# Patient Record
(demographics unavailable — no encounter records)

---

## 2025-06-02 NOTE — PHYSICAL EXAM
[Well Developed] : well developed [Well Nourished] : well nourished [No Acute Distress] : no acute distress [Normal Conjunctiva] : normal conjunctiva [Normal Venous Pressure] : normal venous pressure [No Carotid Bruit] : no carotid bruit [Normal S1, S2] : normal S1, S2 [No Rub] : no rub [5th Left ICS - MCL] : palpated at the 5th LICS in the midclavicular line [Normal] : normal [No Precordial Heave] : no precordial heave was noted [Normal Rate] : normal [Rhythm Regular] : regular [Normal S1] : normal S1 [Normal S2] : normal S2 [No Gallop] : no gallop heard [II] : a grade 2 [___ +] : bilateral [unfilled]U+ pretibial pitting edema [2+] : left 2+ [No Abnormalities] : the abdominal aorta was not enlarged and no bruit was heard [Clear Lung Fields] : clear lung fields [Good Air Entry] : good air entry [No Respiratory Distress] : no respiratory distress  [Soft] : abdomen soft [Non Tender] : non-tender [No Masses/organomegaly] : no masses/organomegaly [Normal Bowel Sounds] : normal bowel sounds [Normal Gait] : normal gait [No Edema] : no edema [No Cyanosis] : no cyanosis [No Clubbing] : no clubbing [No Varicosities] : no varicosities [No Rash] : no rash [No Skin Lesions] : no skin lesions [Moves all extremities] : moves all extremities [No Focal Deficits] : no focal deficits [Normal Speech] : normal speech [Alert and Oriented] : alert and oriented [Normal memory] : normal memory [Apical Thrill] : no thrill palpable at the apex [S3] : no S3 [S4] : no S4 [Click] : no click [Pericardial Rub] : no pericardial rub [Right Carotid Bruit] : no bruit heard over the right carotid [Right Femoral Bruit] : no bruit heard over the right femoral artery [Left Carotid Bruit] : no bruit heard over the left carotid [Left Femoral Bruit] : no bruit heard over the left femoral artery [de-identified] : Bilateral venous stasis changes with edema

## 2025-06-02 NOTE — ASSESSMENT
[FreeTextEntry1] : This is a 60-year-old male with past medical history significant for hypertension, non-insulin-dependent diabetes mellitus, hyperlipidemia, reactive airway disease/asthma, GERD, sleep apnea, obesity, who comes in for cardiac follow-up evaluation.  He was born in the [facility] and has no history of rheumatic fever.  He does not drink excessive caffeine or alcohol.  Cardiac risk factors include hypertension, non-insulin-dependent diabetes mellitus, and hyperlipidemia.  HPI: He is feeling generally well today and denies chest pain, dizziness, heart palpitations, recent episodes of syncope or falls, SOB, or dyspnea at this time.   Current Medications: Losartan 50 mg daily and Ozempic 2 mg injection weekly.   Has been tolerating Ozempic well and has lost ~20 lbs.   *Previously on Simvastatin but states he was told to discontinue this at his last appointment along with his Actos medication  PMH: The patient has bilateral lower extremity edema extending from his ankles to his pretibial area 2+/2+.  This has been present for the last few years (4 to 5 years). He does have venous stasis changes, and reports that he was evaluated by a vascular surgeon with no evidence for peripheral arterial disease. The patient was taking Januvia previously but had to stop because his insurance stopped covering it. He is currently taking Actos for diabetes, which he has taken for several years.  The initiation of Actos therapy correlates with the presence of his peripheral edema.   BLOOD PRESSURE: Controlled.    BLOOD WORK:  *LDL target goal < 70* -New blood work was done by his PCP April 2025 and will be faxed over for our records.  -Lipid panel done April 2024 demonstrated triglycerides 46, cholesterol 143, HDL 82, LDL 50, Non-HDL 60, LDL direct 55.    TESTING/REPORTS: -EKG done 06/02/2025 demonstrated regular sinus rhythm rate 89 bpm otherwise unremarkable.   -The patient had a normal exercise stress test September 9, 2024.  -Echocardiogram done May 2024 demonstrated normal left ventricular systolic function EF 63%, trace tricuspid regurgitation, trace pulmonic regurgitation.  -Electrocardiogram done April 18, 2024 demonstrated normal sinus rhythm rate 77 bpm and is otherwise unremarkable.   PLAN: -He will continue his current medications and contact the office if problems arise before next follow-up appointment. -He will follow-up with his primary care doctor who also manages his diabetes in regard to his diabetes medications.    I have discussed the plan of care with MARY JAY and he will follow up in 4-6 months. They are compliant with all of their medications.     The patient understands that aerobic exercises must be increased to 40 minutes 4 times/week and a detailed discussion of lifestyle modification was done today.   The patient has a good understanding of the diagnosis, treatment plan and lifestyle modification.   They will contact me at the office for any questions with their care or any changes in their health status.   The patient was discussed with supervision physician Dr. Boris Olivier at the time of the visit and the plan of care will be carried out as noted above.     BIANCA De Leon NP

## 2025-06-02 NOTE — DISCUSSION/SUMMARY
[FreeTextEntry1] : Dr. Oliveir-(PRIOR VISIT and PMH WITH Dr. Olivier): This is a 59-year-old male with past medical history significant for hypertension, non-insulin-dependent diabetes mellitus, hyperlipidemia, reactive airway disease/asthma, GERD, sleep apnea, obesity, who comes in for cardiac follow-up evaluation. He denies chest pain, shortness of breath, dizziness or syncope.  He was born in the [facility] and has no history of rheumatic fever.  He does not drink excessive caffeine or alcohol. Cardiac risk factors include hypertension, non-insulin-dependent diabetes mellitus, and hyperlipidemia. The patient is lost over 22 pounds on Ozempic therapy.  He is trying to maintain a prudent diet and regular aerobic activity. The patient had a normal exercise stress test September 9, 2024. He will continue on his current diet and exercise program.  He will go for fasting blood work in the next month.  Blood work done April 18, 2024 demonstrated hemoglobin A1c of 6.2, cholesterol 143, HDL 82, triglycerides 46, LDL calculated 50, non-HDL cholesterol 60, LDL direct 55 mg/dL.  Lipoprotein B50 4 mg/dL Electrocardiogram done April 18, 2024 demonstrated normal sinus rhythm rate 77 bpm and is otherwise unremarkable. The patient has bilateral lower extremity edema extending from his ankles to his pretibial area 2+/2+.  This has been present for for the last few years (4 to 5 years). He does have venous stasis changes, and reports that he was evaluated by a vascular surgeon with no evidence for peripheral arterial disease. The patient was taking Januvia previously but had to stop because his insurance stopped covering it. He is currently taking Actos for diabetes, which he has taken for several years.  The initiation of Actos therapy correlates with the presence of his peripheral edema. A known side effect of Actos is edema, so it is likely either causing or contributing to his edema.  I have recommended patient make an appointment with an endocrinologist to evaluate his effective treatment for his diabetic care. Given the patient's diabetes, and obesity, he is an excellent candidate for GLP-1 agonist therapy.  He has no personal family history of medullary thyroid cancer or M EN syndrome. He is instructed to get compression knee-high stockings 15 to 20 mm. Further recommendations regarding his medications will be made after results of blood tests are available. The patient is instructed follow-up with his primary care physician.  He will follow-up with me after above-noted diagnostic tests are completed. The patient understands that aerobic exercises must be increased to 40 minutes 4 times per week. A detailed discussion of lifestyle modification was done today. The patient has a good understanding of the diagnosis, and treatment plan. Lifestyle modification was also outlined.  Thank you for allowing to participate in care of this patient.  I will "work with you again in the future.

## 2025-06-02 NOTE — REASON FOR VISIT
[CV Risk Factors and Non-Cardiac Disease] : CV risk factors and non-cardiac disease [Hyperlipidemia] : hyperlipidemia [Hypertension] : hypertension [FreeTextEntry3] : Dr. Medina  [FreeTextEntry1] : Pt is a 59-year-old male with a past medical history significant for diabetes, hypertension, asthma, allergic rhinitis, GERD, and obstructive sleep apnea who presents today for a cardiac evaluation. Cardiac risk factors include hypertension and diabetes. Pt denies history of smoking, hyperlipidemia (states he's on a statin for prevention and maintenance but denies ever being told his cholesterol is high), family history of cardiac disease, rheumatic fever, and does not drink excessive caffeine or alcohol.   Pt denies chest pain, palpitations, dizziness, headaches, or syncope. He reports occasional shortness of breath due to chronic asthma.  Patient is currently on Ozempic 2mg dose. He reports he has lost 22 lbs since starting the medication.  Patient had a normal stress test done in office today 9/9/24   PT had a CT of his chest for his lungs on 03/11/2024, which did show some calcification on the aorta and in the coronary arteries, otherwise the heart showed no abnormalities.   Last labs 4/18/24 demonstrated Cholesterol 143, HDL 82, Triglycerides 46, LDL direct 55, Hemoglobin A1C 6.2%.  Patient had echocardiogram done 5/28/24 which demonstrated normal left ventricular systolic function with an ejection fraction of 63%.  EKG on 04/18/2024 shows normal sinus rhythm with a rate of 77 bpm.